# Patient Record
(demographics unavailable — no encounter records)

---

## 2024-10-18 NOTE — HISTORY OF PRESENT ILLNESS
[FreeTextEntry1] : Patient is a 44-year-old female who presents to the gastroenterology office for evaluation.  Per chart, patient was previously established and following up with Dr. Paul Boss for her gastroenterology care.  Patient has history of gastroesophageal reflux disease (GERD) and constipation.  She previously underwent an EGD (upper endoscopy) and colonoscopy with Dr. Boss in November 2022.  The upper endoscopy revealed a small polyp in the gastric body which was removed, with otherwise normal upper endoscopy.  The colonoscopy revealed a diminutive polyp in the ascending colon which was removed and internal hemorrhoids.  Subsequent recommendation by Dr. Boss was for repeat colonoscopy in 5 years.  Pathology results from biopsies of the duodenum revealed no significant histopathologic diagnosis, biopsies from the gastric antrum revealed mild chronic gastritis, biopsies from the gastric body revealed no significant histopathologic diagnosis, the gastric polyp was a fundic gland polyp and the ascending colon polyp was a hyperplastic polyp on pathology.  During office visit today, patient reports difficulty swallowing with solid foods and liquids for the past 2 months.  She reports the difficulty swallowing solid foods and liquids started 2 months ago.  She describes the difficulty swallowing as sensation of solid foods and liquids getting stuck in throat at times.  She reports history of heartburn and acid reflux.  She reports currently taking famotidine 40 mg twice a day, which helps to partially improve the heartburn and acid reflux symptoms.  She reports history of chronic constipation for multiple years.  She reports having 1 bowel movement every 2 to 3 days.  She reports her stools are hard.  She reports straining during bowel movements.  She denies red blood in stools and denies black stools.

## 2024-10-18 NOTE — ASSESSMENT
[FreeTextEntry1] : Patient is a 44-year-old female with dysphagia to solids and liquids, heartburn, acid reflux and chronic constipation.  Given her dysphagia appears to have started 2 months ago, would recommend upper endoscopy (EGD) for evaluation.  Discussed indications, benefits/risks and alternatives to upper endoscopy with the patient.  She reported understanding and she is in agreement with proceeding with upper endoscopy.  All of her questions were answered. Her heartburn and acid reflux symptoms appear to be only partially controlled with famotidine 40 mg twice a day.  Therefore, patient was advised to discontinue the famotidine at present time.  Will instead start the patient on pantoprazole 40 mg once a day.  Patient was advised to take the pantoprazole approximately 30 to 60 minutes before breakfast daily. Patient was advised to start taking Miralax 17 grams once a day for her constipation. Will also check labs for evaluation.       --- Check CBC, BMP, iron/ferritin labs.       --- Discontinue famotidine.       --- Start pantoprazole 40 mg once a day.       --- Start Miralax 17 grams once a day.       --- Schedule upper endoscopy (EGD).       --- Follow-up in GI office in 5 months or sooner if needed.

## 2024-10-18 NOTE — PHYSICAL EXAM
[Alert] : alert [No Acute Distress] : no acute distress [Sclera] : the sclera and conjunctiva were normal [Oropharynx] : the oropharynx was normal [Normal Appearance] : the appearance of the neck was normal [No Respiratory Distress] : no respiratory distress [Auscultation Breath Sounds / Voice Sounds] : lungs were clear to auscultation bilaterally [Heart Rate And Rhythm] : heart rate was normal and rhythm regular [Normal S1, S2] : normal S1 and S2 [None] : no edema [Bowel Sounds] : normal bowel sounds [Abdomen Tenderness] : non-tender [Abdomen Soft] : soft [No CVA Tenderness] : no CVA  tenderness [Abnormal Walk] : normal gait [Normal Color / Pigmentation] : normal skin color and pigmentation [Oriented To Time, Place, And Person] : oriented to person, place, and time [Normal Affect] : the affect was normal [Normal Mood] : the mood was normal [Rebound Tenderness] : no rebound tenderness

## 2024-11-30 NOTE — IMAGING
[All Views] : anteroposterior, lateral, skyline, and anteroposterior standing [Right] : right tibia/fibula [There are no fractures, subluxations or dislocations. No significant abnormalities are seen] : There are no fractures, subluxations or dislocations. No significant abnormalities are seen [de-identified] : likely Osteochondroma noted proximal fibula

## 2024-11-30 NOTE — IMAGING
[All Views] : anteroposterior, lateral, skyline, and anteroposterior standing [Right] : right tibia/fibula [There are no fractures, subluxations or dislocations. No significant abnormalities are seen] : There are no fractures, subluxations or dislocations. No significant abnormalities are seen [de-identified] : likely Osteochondroma noted proximal fibula

## 2024-11-30 NOTE — DISCUSSION/SUMMARY
[Medication Risks Reviewed] : Medication risks reviewed [de-identified] : General Dx Discussion The patient was advised of the diagnosis. The natural history of the pathology was explained in full to the patient in layman's terms. All questions were answered. The risks and benefits of surgical and non-surgical treatment alternatives were explained in full to the patient. Patient was given a prescription for an anti-inflammatory medication.  They will take it for the next 5-7 days and then on an as needed basis, as long as there are no medical contra-indications.  Patient is counseled on possible GI and blood pressure side effects.

## 2024-11-30 NOTE — DISCUSSION/SUMMARY
[Medication Risks Reviewed] : Medication risks reviewed [de-identified] : General Dx Discussion The patient was advised of the diagnosis. The natural history of the pathology was explained in full to the patient in layman's terms. All questions were answered. The risks and benefits of surgical and non-surgical treatment alternatives were explained in full to the patient. Patient was given a prescription for an anti-inflammatory medication.  They will take it for the next 5-7 days and then on an as needed basis, as long as there are no medical contra-indications.  Patient is counseled on possible GI and blood pressure side effects.

## 2024-11-30 NOTE — PHYSICAL EXAM
[NL (150)] : flexion 150 degrees [NL (75)] : Dorsiflexion 75 degrees [NL (85)] : volarflexion 85 degrees [NL (25)] : radial deviation 25 degrees [NL (40)] : ulnar deviation 40 degrees [NL (90)] : supination 90 degrees [Right] : right knee [NL (0)] : extension 0 degrees [5___] : hamstring 5[unfilled]/5 [Equivocal] : equivocal Janet [de-identified] : no tenderness [] : not mildly antalgic [TWNoteComboBox7] : flexion 125 degrees

## 2024-11-30 NOTE — PHYSICAL EXAM
[NL (150)] : flexion 150 degrees [NL (75)] : Dorsiflexion 75 degrees [NL (85)] : volarflexion 85 degrees [NL (25)] : radial deviation 25 degrees [NL (40)] : ulnar deviation 40 degrees [NL (90)] : supination 90 degrees [Right] : right knee [NL (0)] : extension 0 degrees [5___] : hamstring 5[unfilled]/5 [Equivocal] : equivocal Janet [de-identified] : no tenderness [] : not mildly antalgic [TWNoteComboBox7] : flexion 125 degrees

## 2024-11-30 NOTE — HISTORY OF PRESENT ILLNESS
[de-identified] : 11/29/24 - pt is here for right knee, and wrist pain. pt stated pain in knee has been traveling down leg, states she feel down 3 weeks ago. states a shooting pain down to the ankle. some numbness or tingling with  sitting prolonged period of time.  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

## 2024-11-30 NOTE — ASSESSMENT
[FreeTextEntry1] : - Soft she had a traumatic injury/fall 3 to 4 weeks ago with pain to multiple body parts -Evaluation of the knee does show positive lateral joint line tenderness and equivocal Ana María's -X-rays show a osteochondroma on the proximal fibula she does have radicular type symptoms down the right leg Not sure if those symptoms are lumbar related or irritated nerve related from the knee/osteochondroma in light of the fall -Prescription for meloxicam provided-advised her to take once a day over the next 5 days and then as needed  The patient's orthopaedic condition(s) warrants intermittent use of a prescription strength non-steroidal anti-inflammatory medication.  These medications are associated with risks including but not limited to gastrointestinal irritation, kidney damage, hypertension, and bleeding.  The patient understands and will take medications as prescribed.  The patient will stop the medication and consult a physician as needed if problems arise. -Will get MRI of the right knee  and the right lower leg for evaluation of any structural damage -She will also make appointment with our spine team for evaluation of any lumbar issues related to the fall/radiculopathy -We will see her back after the knee and lower leg MRIs for review and delineation of treatment plan

## 2024-11-30 NOTE — HISTORY OF PRESENT ILLNESS
[de-identified] : 11/29/24 - pt is here for right knee, and wrist pain. pt stated pain in knee has been traveling down leg, states she feel down 3 weeks ago. states a shooting pain down to the ankle. some numbness or tingling with  sitting prolonged period of time.  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

## 2024-12-21 NOTE — DATA REVIEWED
[Lower Extremities] : lower extremities [MRI] : MRI [Right] : of the right [Knee] : knee [Report was reviewed and noted in the chart] : The report was reviewed and noted in the chart [FreeTextEntry1] : [id number] osteochondroma projecting off proximal fibular shaft.  [FreeTextEntry2] : no meniscal tear. no ligament pathology. small osteochondroma projecting off the proximal fibular tip laterally.

## 2024-12-21 NOTE — PHYSICAL EXAM
[Right] : right knee [NL (0)] : extension 0 degrees [5___] : hamstring 5[unfilled]/5 [Equivocal] : equivocal Janet [] : not mildly antalgic [TWNoteComboBox7] : flexion 125 degrees

## 2024-12-21 NOTE — DISCUSSION/SUMMARY
[de-identified] : General Dx Discussion The patient was advised of the diagnosis. The natural history of the pathology was explained in full to the patient in layman's terms. All questions were answered. The risks and benefits of surgical and non-surgical treatment alternatives were explained in full to the patient.  Mris reviewed  advised of finding and need for f/u in 6 months to eval her osteochondronma  he buttock and leg pain can be from her back and is foing to see one of our spine doctors  will try a medrol dose pack for now  britton zeng

## 2024-12-21 NOTE — DISCUSSION/SUMMARY
[de-identified] : General Dx Discussion The patient was advised of the diagnosis. The natural history of the pathology was explained in full to the patient in layman's terms. All questions were answered. The risks and benefits of surgical and non-surgical treatment alternatives were explained in full to the patient.  Mris reviewed  advised of finding and need for f/u in 6 months to eval her osteochondronma  he buttock and leg pain can be from her back and is foing to see one of our spine doctors  will try a medrol dose pack for now  britton zeng

## 2024-12-21 NOTE — HISTORY OF PRESENT ILLNESS
[de-identified] : Patient is here for MRI results of her right lower leg and right knee. [FreeTextEntry1] : Right lower leg and right knee

## 2024-12-21 NOTE — DATA REVIEWED
[Lower Extremities] : lower extremities [MRI] : MRI [Right] : of the right [Knee] : knee [Report was reviewed and noted in the chart] : The report was reviewed and noted in the chart [FreeTextEntry1] : [id number] osteochondroma projecting off proximal fibular shaft.  [FreeTextEntry2] : no meniscal tear. no ligament pathology. small osteochondroma projecting off the proximal fibular tip laterally.

## 2024-12-21 NOTE — HISTORY OF PRESENT ILLNESS
[de-identified] : Patient is here for MRI results of her right lower leg and right knee. [FreeTextEntry1] : Right lower leg and right knee

## 2024-12-21 NOTE — DATA REVIEWED
[Lower Extremities] : lower extremities [MRI] : MRI [Right] : of the right [Knee] : knee [Report was reviewed and noted in the chart] : The report was reviewed and noted in the chart [FreeTextEntry1] : [id number] osteochondroma projecting off proximal fibular shaft.  [FreeTextEntry2] : no meniscal tear. no ligament pathology. small osteochondroma projecting off the proximal fibular tip laterally.

## 2024-12-21 NOTE — HISTORY OF PRESENT ILLNESS
[de-identified] : Patient is here for MRI results of her right lower leg and right knee. [FreeTextEntry1] : Right lower leg and right knee

## 2024-12-21 NOTE — DISCUSSION/SUMMARY
[de-identified] : General Dx Discussion The patient was advised of the diagnosis. The natural history of the pathology was explained in full to the patient in layman's terms. All questions were answered. The risks and benefits of surgical and non-surgical treatment alternatives were explained in full to the patient.  Mris reviewed  advised of finding and need for f/u in 6 months to eval her osteochondronma  he buttock and leg pain can be from her back and is foing to see one of our spine doctors  will try a medrol dose pack for now  britton zeng

## 2025-01-03 NOTE — HISTORY OF PRESENT ILLNESS
[8] : 8 [5] : 5 [Localized] : localized [Radiating] : radiating [Intermittent] : intermittent [Nothing helps with pain getting better] : Nothing helps with pain getting better [Exercising] : exercising [Full time] : Work status: full time [de-identified] :  01/03/2025:  ENZO PATEL a 45 year old female presents today for right leg pain that travels to front of leg to foot, no numbness or tingling, feels stiffness when sitting for long periods of time. Patient states she had a fall in November that is when symptoms began. She was seen by Dr. 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

## 2025-01-03 NOTE — DISCUSSION/SUMMARY
[Medication Risks Reviewed] : Medication risks reviewed [de-identified] : reviewed the case and the imaging with the patient  right leg pain  THE leg w/u not revealing  discussion of the condition and treatment options cautions discussed questions answered discussion of natural history of the condition and what the next step would be mri l SPINE  rec she take the MDP  fu tor review the mRI  if we dont see much on the MRI consider neurology?

## 2025-01-03 NOTE — DATA REVIEWED
[MRI] : MRI [Knee] : knee [Report was reviewed and noted in the chart] : The report was reviewed and noted in the chart [I independently reviewed and interpreted images and report] : I independently reviewed and interpreted images and report

## 2025-02-07 NOTE — DISCUSSION/SUMMARY
[Medication Risks Reviewed] : Medication risks reviewed [de-identified] : reviewed the case and the imaging with the patient  MRI is clean  back looks okay  consider rheum or neurology for further eval try somecelebrex  no back intervention indicated

## 2025-02-07 NOTE — DISCUSSION/SUMMARY
[Medication Risks Reviewed] : Medication risks reviewed [de-identified] : reviewed the case and the imaging with the patient  MRI is clean  back looks okay  consider rheum or neurology for further eval try somecelebrex  no back intervention indicated

## 2025-02-07 NOTE — HISTORY OF PRESENT ILLNESS
[8] : 8 [5] : 5 [Localized] : localized [Radiating] : radiating [Intermittent] : intermittent [Nothing helps with pain getting better] : Nothing helps with pain getting better [Exercising] : exercising [Full time] : Work status: full time [de-identified] :  01/03/2025:  ENZO PATEL a 45 year old female presents today for right leg pain that travels to front of leg to foot, no numbness or tingling, feels stiffness when sitting for long periods of time. Patient states she had a fall in November that is when symptoms began. She was seen by Dr. 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   She references a fall a couple of months ago  xrays today L spine - negative  Ap PELVIS - negative   has tried mobic  No PT/chiro/accupucnture No prior leg or back surgery  She has some steroids but hasnt taken it yet  tried massager   MRi Knee right - negative mri leg - osteochondroma  pancreas tumor being monitored   2/7/25: Here for fu - plan at last was "right leg pain  THE leg w/u not revealing  discussion of the condition and treatment options cautions discussed questions answered discussion of natural history of the condition and what the next step would be mri l SPINE  rec she take the MDP  fu tor review the mRI  if we dont see much on the MRI consider neurology?"  overall doing a bit better with MDP right leg pain remains  [facility] spine - [facility] - see report  by my read - mild disc bulges  [] : no

## 2025-02-07 NOTE — HISTORY OF PRESENT ILLNESS
[8] : 8 [5] : 5 [Localized] : localized [Radiating] : radiating [Intermittent] : intermittent [Nothing helps with pain getting better] : Nothing helps with pain getting better [Exercising] : exercising [Full time] : Work status: full time [de-identified] :  01/03/2025:  ENZO PATEL a 45 year old female presents today for right leg pain that travels to front of leg to foot, no numbness or tingling, feels stiffness when sitting for long periods of time. Patient states she had a fall in November that is when symptoms began. She was seen by Dr. 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   She references a fall a couple of months ago  xrays today L spine - negative  Ap PELVIS - negative   has tried mobic  No PT/chiro/accupucnture No prior leg or back surgery  She has some steroids but hasnt taken it yet  tried massager   MRi Knee right - negative mri leg - osteochondroma  pancreas tumor being monitored   2/7/25: Here for fu - plan at last was "right leg pain  THE leg w/u not revealing  discussion of the condition and treatment options cautions discussed questions answered discussion of natural history of the condition and what the next step would be mri l SPINE  rec she take the MDP  fu tor review the mRI  if we dont see much on the MRI consider neurology?"  overall doing a bit better with MDP right leg pain remains  [facility] spine - [facility] - see report  by my read - mild disc bulges  [] : no